# Patient Record
Sex: FEMALE | Race: WHITE | Employment: FULL TIME | URBAN - METROPOLITAN AREA
[De-identification: names, ages, dates, MRNs, and addresses within clinical notes are randomized per-mention and may not be internally consistent; named-entity substitution may affect disease eponyms.]

---

## 2020-06-23 ENCOUNTER — OFFICE VISIT (OUTPATIENT)
Dept: URGENT CARE | Facility: URGENT CARE | Age: 25
End: 2020-06-23
Payer: COMMERCIAL

## 2020-06-23 VITALS
TEMPERATURE: 97.2 F | SYSTOLIC BLOOD PRESSURE: 101 MMHG | HEART RATE: 52 BPM | OXYGEN SATURATION: 100 % | DIASTOLIC BLOOD PRESSURE: 65 MMHG | WEIGHT: 133.8 LBS

## 2020-06-23 DIAGNOSIS — H61.23 BILATERAL IMPACTED CERUMEN: Primary | ICD-10-CM

## 2020-06-23 PROCEDURE — 69210 REMOVE IMPACTED EAR WAX UNI: CPT | Mod: 50 | Performed by: FAMILY MEDICINE

## 2020-06-23 SDOH — HEALTH STABILITY: MENTAL HEALTH: HOW OFTEN DO YOU HAVE A DRINK CONTAINING ALCOHOL?: NEVER

## 2020-06-23 NOTE — PROGRESS NOTES
Subjective:   Maribell Robles is a 25 year old female who presents for   Chief Complaint   Patient presents with     Ear Problem     has very small ear canals and has to get her ears cleaned every once in a while but with COVID she has not been able to get in. It is mostly her right ear      Hx of ear tube placed every 6-12 months to help with pressure regulation due to COVID unable to get in for a regular cleaning.     Okay to do flushes but typically will get suction or manual disimpaction done.     Slightly decreased hearing on the right side.     There are no active problems to display for this patient.      No current outpatient medications on file.     No current facility-administered medications for this visit.        ROS:  As above per HPI    Objective:   /65 (BP Location: Left arm, Patient Position: Sitting, Cuff Size: Adult Regular)   Pulse 52   Temp 97.2  F (36.2  C) (Temporal)   Wt 60.7 kg (133 lb 12.8 oz)   SpO2 100% , There is no height or weight on file to calculate BMI.  Gen:  NAD, well-nourished, sitting in chair comfortably  HEENT: EOMI, sclera anicteric, Head normocephalic, ; nares patent; moist mucous membranes, narrow canals bilaterally impacted cerumen right side, moderate cerumen left side  Neck: trachea midline, no thyromegaly  CV:  Hemodynamically stable  Pulm:  no increased work of breathing       No results found for any visits on 06/23/20.    Assessment & Plan:   Maribell Robles, 25 year old female who presents with:  Bilateral impacted cerumen  - REMOVE IMPACTED CERUMEN  - HC REMOVAL IMPACTED CERUMEN IRRIGATION/LVG UNILAT      Partial removal of wax right side manually (curette) but deep and discomforting against TM thus light irrigation was done (requested by patient), left side a curette was exclusively used to manually remove wax. Small amounts of cerumen remain but unfortunately we do not have a suction device to further remove this wax. Patient recommended to continue f/u with ENT  clinic for regular maintenance. She did note improvement in hearing afterwards    Tyrone Varner MD   [facility]    The use of [employer] dictation services may have been used to construct the content in this note; any grammatical or spelling errors are non-intentional. Please contact the author of this note directly if you are in need of any clarification.

## 2021-01-04 ENCOUNTER — HEALTH MAINTENANCE LETTER (OUTPATIENT)
Age: 26
End: 2021-01-04

## 2021-10-10 ENCOUNTER — HEALTH MAINTENANCE LETTER (OUTPATIENT)
Age: 26
End: 2021-10-10

## 2022-01-30 ENCOUNTER — HEALTH MAINTENANCE LETTER (OUTPATIENT)
Age: 27
End: 2022-01-30

## 2022-09-24 ENCOUNTER — HEALTH MAINTENANCE LETTER (OUTPATIENT)
Age: 27
End: 2022-09-24

## 2023-05-08 ENCOUNTER — HEALTH MAINTENANCE LETTER (OUTPATIENT)
Age: 28
End: 2023-05-08